# Patient Record
Sex: MALE | Race: WHITE | NOT HISPANIC OR LATINO | Employment: FULL TIME | URBAN - METROPOLITAN AREA
[De-identification: names, ages, dates, MRNs, and addresses within clinical notes are randomized per-mention and may not be internally consistent; named-entity substitution may affect disease eponyms.]

---

## 2020-09-02 ENCOUNTER — TELEPHONE (OUTPATIENT)
Dept: PAIN MEDICINE | Facility: CLINIC | Age: 55
End: 2020-09-02

## 2020-09-02 NOTE — TELEPHONE ENCOUNTER
----- Message from Jace Goodman MD sent at 9/1/2020  2:52 PM EDT -----  If patient is okay with practice philosophy, then, okay to schedule    Referred by Dr. Curly Edwards  For low back pain    RUI [id number] (Tylenol No. 3 #12 on 7/28/2020)  MRI of the lumbar spine without contrast August 20, 2020, radiology report: Diffuse epidural lipomatosis which contributes to some narrowing of the central canal.  Normal alignment.    L2-3, L3-4: Mild canal stenosis due to epidural lipomatosis  L4-5: Small posterior broad-based disc bulge.  No significant canal or foraminal stenosis.  Mild facet hypertrophy.  Epidural lipomatosis  L5-S1: Small posterior broad-based disc bulge.  No significant canal or foraminal stenosis.  Facet hypertrophy.  Epidural lipomatosis.  ----- Message -----  From: Jace Goodman MD  Sent: 8/31/2020   8:29 PM EDT  To: Jace Goodman MD, #    Please run a Rui report  Please request MRI report (copy scanned in media is blurry)    Please let me know when they are available for review  ThanksDr DEE

## 2020-10-01 PROBLEM — M47.816 SPONDYLOSIS OF LUMBAR REGION WITHOUT MYELOPATHY OR RADICULOPATHY: Status: ACTIVE | Noted: 2020-10-01

## 2020-10-01 PROBLEM — M51.37 DEGENERATION OF LUMBAR OR LUMBOSACRAL INTERVERTEBRAL DISC: Status: ACTIVE | Noted: 2020-10-01

## 2020-10-08 ENCOUNTER — OFFICE VISIT (OUTPATIENT)
Dept: PAIN MEDICINE | Facility: CLINIC | Age: 55
End: 2020-10-08

## 2020-10-08 VITALS
OXYGEN SATURATION: 98 % | BODY MASS INDEX: 39.65 KG/M2 | RESPIRATION RATE: 14 BRPM | HEART RATE: 59 BPM | TEMPERATURE: 97.7 F | HEIGHT: 71 IN | WEIGHT: 283.2 LBS | DIASTOLIC BLOOD PRESSURE: 82 MMHG | SYSTOLIC BLOOD PRESSURE: 122 MMHG

## 2020-10-08 DIAGNOSIS — M51.37 DEGENERATION OF LUMBAR OR LUMBOSACRAL INTERVERTEBRAL DISC: ICD-10-CM

## 2020-10-08 DIAGNOSIS — E66.8 MODERATE OBESITY: ICD-10-CM

## 2020-10-08 DIAGNOSIS — M48.062 LUMBAR STENOSIS WITH NEUROGENIC CLAUDICATION: ICD-10-CM

## 2020-10-08 DIAGNOSIS — M47.816 SPONDYLOSIS OF LUMBAR REGION WITHOUT MYELOPATHY OR RADICULOPATHY: ICD-10-CM

## 2020-10-08 DIAGNOSIS — M47.816 SPONDYLOSIS OF LUMBAR REGION WITHOUT MYELOPATHY OR RADICULOPATHY: Primary | ICD-10-CM

## 2020-10-08 DIAGNOSIS — M48.26 LUMBAR INTERSPINOUS BURSITIS: ICD-10-CM

## 2020-10-08 PROCEDURE — 99204 OFFICE O/P NEW MOD 45 MIN: CPT | Performed by: ANESTHESIOLOGY

## 2020-10-08 RX ORDER — ME-TETRAHYDROFOLATE/B12/HRB236 1-1-500 MG
1 CAPSULE ORAL DAILY
Qty: 90 CAPSULE | Refills: 1 | Status: SHIPPED | OUTPATIENT
Start: 2020-10-08

## 2020-10-08 RX ORDER — CELECOXIB 200 MG/1
200 CAPSULE ORAL DAILY
Qty: 30 CAPSULE | Refills: 1 | Status: SHIPPED | OUTPATIENT
Start: 2020-10-08

## 2020-10-19 ENCOUNTER — APPOINTMENT (OUTPATIENT)
Dept: PREADMISSION TESTING | Facility: HOSPITAL | Age: 55
End: 2020-10-19

## 2020-10-19 LAB — SARS-COV-2 RNA RESP QL NAA+PROBE: NOT DETECTED

## 2020-10-19 PROCEDURE — C9803 HOPD COVID-19 SPEC COLLECT: HCPCS

## 2020-10-19 PROCEDURE — U0004 COV-19 TEST NON-CDC HGH THRU: HCPCS

## 2020-10-21 ENCOUNTER — OUTSIDE FACILITY SERVICE (OUTPATIENT)
Dept: PAIN MEDICINE | Facility: CLINIC | Age: 55
End: 2020-10-21

## 2020-10-21 PROCEDURE — 64493 INJ PARAVERT F JNT L/S 1 LEV: CPT | Performed by: ANESTHESIOLOGY

## 2020-10-21 PROCEDURE — 64494 INJ PARAVERT F JNT L/S 2 LEV: CPT | Performed by: ANESTHESIOLOGY

## 2020-10-21 PROCEDURE — 99152 MOD SED SAME PHYS/QHP 5/>YRS: CPT | Performed by: ANESTHESIOLOGY

## 2020-10-22 ENCOUNTER — TELEPHONE (OUTPATIENT)
Dept: PAIN MEDICINE | Facility: CLINIC | Age: 55
End: 2020-10-22

## 2020-10-22 NOTE — TELEPHONE ENCOUNTER
diagnostic and therapeutic lumbar facet joint injections bilateral L4-L5, bilateral L5-S1 combined with interspinous bursa injections at L4-L5 and L5-S1 10/21/2020.    Attempted to contact patient. No answer. Automatic recording stating the persons voicemail box has not been set up yet.   Appointment card placed in outgoing mail.

## 2020-12-10 NOTE — PROGRESS NOTES
"Chief Complaint: \"I am still struggling with lower back pain.\"      History of Present Illness:   Patient: Mr. Jose Carbajal, 55 y.o. male originally referred by Dr. Curly Edwards in consultation for chronic intractable lower back pain. Patient reports a 5-year history of lower back pain, which began without incident.  Patient was last seen on October 21, 2020, when he underwent diagnostic and therapeutic lumbar facet joint injections bilateral L4-L5 and bilateral L5-S1 combined with a lumbar interspinous bursa injection, for which he reports experiencing 80% pain relief and functional improvement lasting one week. He reports progressive recurrence to previous levels.  He did not begin physical therapy as medically advised.  He did not undergo consultation with [facility] weight loss as medically advised.  He returns today for postprocedure follow-up and evaluation.  Pain Description: Constant pain with intermittent exacerbation, described as aching, burning and throbbing sensation.   Radiation of Pain: The pain does not radiate.  Pain intensity today: 8/10  Average pain intensity last week: 7/10  Pain intensity ranges from: 6/10 to 10/10  Aggravating factors: Pain increases with twisting, bending, standing, ambulating  Alleviating factors: Pain decreases with sitting and lying.     Associated Symptoms:   Patient denies pain, numbness and weakness in the lower extremities.   Patient denies any new bladder or bowel problems.   Patient denies difficulties with his balance or recent falls.     Review of previous therapies and additional medical records:  Jose Carbajal has already failed the following measures, including:   Conservative Measures: Oral analgesics, topical analgesics and physical therapy   Interventional Measures: 10/21/2020: DxTx lumbar facet joint injections bilateral L4-L5 and bilateral L5-S1 combined with a lumbar interspinous bursa injection  Surgical Measures: No history of previous lumbar " spine or hip surgery   Jose Carbajal has not undergone orthopedic or surgical consultation for this condition  Jose Carbajal presents with significant comorbidities including HTN, hyperlipedemia, morbid obesity  In terms of current analgesics, Jose Carbajal takes: Tylenol and a muscle relaxer   I have reviewed Madhu Report #[id number] consistent with medication reconciliation.  SOAPP: Low Risk    Global Pain Scale 10-08  2020 12-14  2020         Pain 21 19         Feelings 12 0         Clinical outcomes 22 6         Activities 23 4         GPS Total: 78 29           Review of Diagnostic Studies:    MRI of the lumbar spine without contrast August 20, 2020, radiology report: Diffuse epidural lipomatosis which contributes to some narrowing of the central canal.  Normal alignment.  L2-L3, L3-L4: Mild canal stenosis due to epidural lipomatosis  L4-L5: Small posterior broad-based disc bulge.  No significant canal or foraminal stenosis.  Facet hypertrophy.  Epidural lipomatosis  L5-S1: Small posterior broad-based disc bulge.  No significant canal or foraminal stenosis.  Facet hypertrophy.  Epidural lipomatosis    Review of Systems   Musculoskeletal: Positive for back pain.   All other systems reviewed and are negative.        Patient Active Problem List   Diagnosis   • Spondylosis of lumbar region without myelopathy or radiculopathy   • Degeneration of lumbar or lumbosacral intervertebral disc   • Moderate obesity   • Lumbar interspinous bursitis   • Lumbar stenosis with neurogenic claudication       Past Medical History:   Diagnosis Date   • Hyperlipidemia    • Hypertension          No past surgical history on file.      No family history on file.      Social History     Socioeconomic History   • Marital status:      Spouse name: Not on file   • Number of children: Not on file   • Years of education: Not on file   • Highest education level: Not on file   Tobacco Use   • Smoking status: Current Every Day Smoker    Substance and Sexual Activity   • Alcohol use: Yes     Alcohol/week: 1.0 standard drinks     Types: 1 Cans of beer per week     Comment: occasion   • Drug use: Never           Current Outpatient Medications:   •  celecoxib (CeleBREX) 200 MG capsule, Take 1 capsule by mouth Daily., Disp: 30 capsule, Rfl: 1  •  Dietary Management Product (Rheumate) capsule, Take 1 capsule by mouth Daily., Disp: 90 capsule, Rfl: 1  •  Gel Base gel, 2 g 4 (Four) Times a Day. prilocaine 2%, lidocaine 10%, imipramine 3%, capsaicin 0.001% and mannitol 20%, Disp: 240 g, Rfl: 5      No Known Allergies      There were no vitals taken for this visit.      Physical Exam:  Constitutional: Patient appears well-developed and well-nourished.   HEENT: Head: Normocephalic and atraumatic.   Eyes: Conjunctivae and lids are normal.   Pupils: Equal, round, reactive to light.   Neck: Trachea normal. Neck supple. No JVD present.   Pulmonary Respiratory effort: Auscultation of lungs: Clear to auscultation.   Cardiovascular Auscultation of heart: Normal rate and rhythm, normal S1 and S2, no murmurs.   Peripheral vascular exam: Femoral: right 2+, left 2+. Posterior tibialis: right 2+ and left 2+. Dorsalis pedis: right 2+ and left 2+. No edema.   Abdomen: The abdomen was soft and nontender. Bowel sounds were normal.   Musculoskeletal   Gait and station: Gait evaluation demonstrated a normal gait   Lumbar Spine: Passive and active range of motion are limited secondary to pain. Extension, flexion, lateral flexion, rotation of the lumbar spine increased and reproduced pain. Lumbar facet joint loading maneuvers are positive.  Palpation of the interspinous space at L4-L5 and L5-S1 reproduces pain  Basilio test and Gaenslen's test are negative   Piriformis maneuvers are negative   Palpation of the bilateral greater trochanter, unrevealing   Examination of the Iliotibial band: unrevealing   Hip joints: The range of motion of the hip joints is symmetrical, slightly  limited to full flexion and internal/external rotation but without pain  Neurological:   Patient is alert and oriented to person, place, and time.   Speech: speech is normal.   Cortical function: Normal mental status.   Cranial nerves: Cranial nerves 2-12 intact.   Reflex Scores:  Right patellar: 2+  Left patellar: 2+  Right Achilles: 1+  Left Achilles: 1+  Motor strength: 5/5  Motor Tone: normal tone.   Involuntary movements: none.   Superficial/Primitive Reflexes: primitive reflexes were absent.   Right Larios: absent  Left Larios: absent  Right ankle clonus: absent  Left ankle clonus: absent   Babinsky: absent  Negative long tract signs. Straight leg raising test is negative. Femoral stretch sign is negative.   Sensation: No sensory loss. Sensory exam: intact to light touch, intact pain and temperature sensation, intact vibration sensation and normal proprioception.   Coordination: Normal finger to nose and heel to shin. Normal balance and negative Romberg's sign   Skin and subcutaneous tissue: Skin is warm and intact. No rash noted. No cyanosis.   Psychiatric: Judgment and insight: Normal. Orientation to person, place and time: Normal. Recent and remote memory: Intact. Mood and affect: Normal.     ASSESSMENT:   1. Spondylosis of lumbar region without myelopathy or radiculopathy    2. Lumbar interspinous bursitis    3. Lumbar stenosis with neurogenic claudication    4. Degeneration of lumbar or lumbosacral intervertebral disc    5. Moderate obesity        PLAN/MEDICAL DECISION MAKING: Patient reports a 5-year history of lower back pain. MRI of the lumbar spine revealed multilevel epidural lipomatosis, facet arthropathy, degenerative disc disease without significant canal or foraminal stenosis.  He does report some improvements in his mechanical and axial pain, although he does continue to have symptoms related to neurogenic claudication.  Unfortunately, he did not participate in physical therapy despite medical  advice to maximize his rehabilitation after interventional pain management measures.  In addition, he has declined an additional referral to [facility] weight loss to assist him in losing weight to help alleviate some of his ongoing lower back symptoms.  He tells me he may lose his insurance coverage at the end of the year, and would like to figure out if his coverage will be extended before proceeding with any other interventional pain management measures.  I also reinforced to the patient we do not prescribe opioids or long-term narcotics for any of our patients.  Patient has failed to obtain pain relief with conservative measures, as referenced above. I have reviewed all available patient's medical records as well as previous therapies as referenced above. I had a lengthy conversation with Mr. Jose Carbajal regarding his chronic pain condition and potential therapeutic options including risks, benefits, alternative therapies, to name a few. Therefore, I have proposed the following plan:  1. Pharmacological measures: Reviewed and discussed.   A. Patient takes Tylenol and a muscle relaxer (he does not recall the name)  B. Continue celebrex 200 mg once a day as needed for moderate to severe breakthrough pain  C. Continue prilocaine 2%, lidocaine 10%, imipramine 3%, capsaicin 0.001% and mannitol 20%  cream, apply 1 to 2 grams of cream to the affected areas every 4 to 6 hours as needed  2. Interventional pain management measures:  Patient reports he may lose his insurance coverage at the end of the year, and would like to figure out if his coverage will be extended before proceeding with any other interventional pain management measures.  If he obtains insurance coverage he would like to proceed with scheduling diagnostic bilateral lumbar medial branch blocks at L3, L4, L5; for bilateral lumbar facet joints at L4-L5, L5-S1 to clarify the origin of chronic refractory mechanical lower back pain. If patient  experiences more than 80% relief along with significant improvement the range of motion of the lumbar spine, then, patient will be scheduled for a second set of diagnostic bilateral lumbar medial branch blocks, to then, proceed with bilateral lumbar medial branch rhizotomies.  Otherwise, we may consider diagnostic therapeutic bilateral L4-L5 transforaminal epidural steroid injections vs  NSA consultation  3. Long-term rehabilitation efforts:  A. The patient does not have a history of falls. I did complete a risk assessment for falls  B. Patient will start a comprehensive physical therapy program for core strengthening, gait and balance training, neurodynamics, ultrasound, myofascial release, cupping and dry needling   C. Start an exercise program such as yoga and daily walks for fitness  D. Contrast therapy: Apply ice-packs for 15-20 minutes, followed by heating pads for 15-20 minutes to affected area   E. Patient has declined an additional referral to [facility]. Patient counseled on the importance of weight loss to help with overall health and pain control. Patient instructed to attempt weight loss.    4. The patient has been instructed to contact my office with any questions or difficulties. The patient understands the plan and agrees to proceed accordingly.      Patient Care Team:  Eduardo Rawls CSA as PCP - General (Certified Surgical Assistant)     No orders of the defined types were placed in this encounter.        Future Appointments   Date Time Provider Department Center   12/14/2020  9:30 AM Mallika Little APRN [facility]         [facility]     EMR Dragon/Transcription disclaimer:  Much of this encounter note is an electronic transcription of spoken language to printed text. Electronic transcription of spoken language may permit erroneous, or at times, nonsensical words or phrases to be inadvertently transcribed. Although I have reviewed the note  for such errors, some may still exist.

## 2020-12-14 ENCOUNTER — OFFICE VISIT (OUTPATIENT)
Dept: PAIN MEDICINE | Facility: CLINIC | Age: 55
End: 2020-12-14

## 2020-12-14 VITALS
WEIGHT: 289.8 LBS | BODY MASS INDEX: 40.57 KG/M2 | SYSTOLIC BLOOD PRESSURE: 120 MMHG | OXYGEN SATURATION: 95 % | DIASTOLIC BLOOD PRESSURE: 72 MMHG | HEIGHT: 71 IN | TEMPERATURE: 98.1 F | HEART RATE: 61 BPM

## 2020-12-14 DIAGNOSIS — M48.26 LUMBAR INTERSPINOUS BURSITIS: ICD-10-CM

## 2020-12-14 DIAGNOSIS — M47.816 SPONDYLOSIS OF LUMBAR REGION WITHOUT MYELOPATHY OR RADICULOPATHY: ICD-10-CM

## 2020-12-14 DIAGNOSIS — M51.37 DEGENERATION OF LUMBAR OR LUMBOSACRAL INTERVERTEBRAL DISC: ICD-10-CM

## 2020-12-14 DIAGNOSIS — E66.8 MODERATE OBESITY: ICD-10-CM

## 2020-12-14 DIAGNOSIS — M48.062 LUMBAR STENOSIS WITH NEUROGENIC CLAUDICATION: ICD-10-CM

## 2020-12-14 PROCEDURE — 99213 OFFICE O/P EST LOW 20 MIN: CPT | Performed by: NURSE PRACTITIONER
